# Patient Record
Sex: MALE | Race: WHITE | Employment: FULL TIME | URBAN - METROPOLITAN AREA
[De-identification: names, ages, dates, MRNs, and addresses within clinical notes are randomized per-mention and may not be internally consistent; named-entity substitution may affect disease eponyms.]

---

## 2021-01-31 ENCOUNTER — HOSPITAL ENCOUNTER (EMERGENCY)
Age: 41
Discharge: HOME | End: 2021-01-31
Attending: EMERGENCY MEDICINE
Payer: COMMERCIAL

## 2021-01-31 VITALS
OXYGEN SATURATION: 99 % | DIASTOLIC BLOOD PRESSURE: 85 MMHG | WEIGHT: 135 LBS | HEIGHT: 67 IN | RESPIRATION RATE: 18 BRPM | SYSTOLIC BLOOD PRESSURE: 118 MMHG | BODY MASS INDEX: 21.19 KG/M2 | HEART RATE: 65 BPM | TEMPERATURE: 98.4 F

## 2021-01-31 DIAGNOSIS — L03.031 CELLULITIS OF FOURTH TOE, RIGHT: Primary | ICD-10-CM

## 2021-01-31 PROCEDURE — 75810000289 HC I&D ABSCESS SIMP/COMP/MULT

## 2021-01-31 PROCEDURE — 74011250637 HC RX REV CODE- 250/637: Performed by: EMERGENCY MEDICINE

## 2021-01-31 PROCEDURE — 74011000250 HC RX REV CODE- 250: Performed by: EMERGENCY MEDICINE

## 2021-01-31 PROCEDURE — 99283 EMERGENCY DEPT VISIT LOW MDM: CPT

## 2021-01-31 RX ORDER — CIPROFLOXACIN 500 MG/1
750 TABLET ORAL
Status: COMPLETED | OUTPATIENT
Start: 2021-01-31 | End: 2021-01-31

## 2021-01-31 RX ORDER — LIDOCAINE HYDROCHLORIDE 10 MG/ML
5 INJECTION, SOLUTION EPIDURAL; INFILTRATION; INTRACAUDAL; PERINEURAL ONCE
Status: COMPLETED | OUTPATIENT
Start: 2021-01-31 | End: 2021-01-31

## 2021-01-31 RX ORDER — CIPROFLOXACIN 750 MG/1
750 TABLET, FILM COATED ORAL 2 TIMES DAILY
Qty: 28 TAB | Refills: 0 | Status: SHIPPED | OUTPATIENT
Start: 2021-01-31 | End: 2021-02-14

## 2021-01-31 RX ORDER — ONDANSETRON 4 MG/1
4 TABLET, ORALLY DISINTEGRATING ORAL
Status: COMPLETED | OUTPATIENT
Start: 2021-01-31 | End: 2021-01-31

## 2021-01-31 RX ORDER — CIPROFLOXACIN 750 MG/1
750 TABLET, FILM COATED ORAL 2 TIMES DAILY
Qty: 28 TAB | Refills: 0 | OUTPATIENT
Start: 2021-01-31 | End: 2021-01-31 | Stop reason: SDUPTHER

## 2021-01-31 RX ADMIN — CIPROFLOXACIN HYDROCHLORIDE 750 MG: 500 TABLET, FILM COATED ORAL at 23:26

## 2021-01-31 RX ADMIN — ONDANSETRON 4 MG: 4 TABLET, ORALLY DISINTEGRATING ORAL at 22:57

## 2021-01-31 RX ADMIN — LIDOCAINE HYDROCHLORIDE 5 ML: 10 INJECTION, SOLUTION EPIDURAL; INFILTRATION; INTRACAUDAL; PERINEURAL at 22:38

## 2021-02-01 NOTE — ED NOTES
Pt in ED w/ complaint of 4th R toe, 2nd L toe, and 1st L finger tenderness and swelling. Pt also has redness to his toes and finger. Pt denies any fever, itching, injury, or drainage from these areas. Pt denies these symptoms happening before. Pt is A&O X 4 and appears to be in no distress. Emergency Department Nursing Plan of Care       The Nursing Plan of Care is developed from the Nursing assessment and Emergency Department Attending provider initial evaluation. The plan of care may be reviewed in the ED Provider note.     The Plan of Care was developed with the following considerations:   Patient / Family readiness to learn indicated by:verbalized understanding  Persons(s) to be included in education: patient  Barriers to Learning/Limitations:No    Signed     Cammy Quevedo RN    1/31/2021   11:18 PM

## 2021-02-01 NOTE — ED PROVIDER NOTES
EMERGENCY DEPARTMENT HISTORY AND PHYSICAL EXAM      Date: 1/31/2021  Patient Name: Froilan Garcia    History of Presenting Illness     Chief Complaint   Patient presents with    Toe Pain       History Provided By: Patient    HPI: Froilan Garcia, 47 y.o. male presents to the ED with cc of right fourth toe redness, pain, swelling. Symptoms have been progressively worsening over the past 3 to 5 days. He has been on doxycycline without significant relief of symptoms. He has increased swelling and pain just proximal to the nail and concern for paronychia. He swims frequently in an indoor pool and also bikes frequently. Denies any fevers, chills. Still able to ambulate. No injury or trauma. There are no other complaints, changes, or physical findings at this time. PCP: None    No current facility-administered medications on file prior to encounter. No current outpatient medications on file prior to encounter. Past History     Past Medical History:  History reviewed. No pertinent past medical history. Past Surgical History:  History reviewed. No pertinent surgical history. Family History:  History reviewed. No pertinent family history. Social History:  Social History     Tobacco Use    Smoking status: Never Smoker    Smokeless tobacco: Never Used   Substance Use Topics    Alcohol use: Never     Frequency: Never    Drug use: Never       Allergies:  No Known Allergies      Review of Systems   Review of Systems   Constitutional: Negative for chills and fever. Musculoskeletal: Positive for joint swelling. Negative for gait problem. Skin: Positive for color change. Neurological: Negative for weakness and numbness. All other systems reviewed and are negative. Physical Exam   Physical Exam  Vitals signs and nursing note reviewed. Constitutional:       General: He is not in acute distress. Appearance: Normal appearance. He is not ill-appearing or toxic-appearing.    HENT:      Head: Normocephalic and atraumatic. Cardiovascular:      Rate and Rhythm: Normal rate and regular rhythm. Pulmonary:      Effort: Pulmonary effort is normal. No respiratory distress. Musculoskeletal:      Comments: Right fourth toe pain, erythema, swelling consistent with cellulitis. There is some firmness and increased swelling just proximal to the nailbed consistent with possible paronychia. Neurovascularly intact. No sign of cellulitis throughout foot. Skin:     General: Skin is warm and dry. Findings: No erythema or rash. Neurological:      General: No focal deficit present. Mental Status: He is alert and oriented to person, place, and time. Diagnostic Study Results     Labs -   No results found for this or any previous visit (from the past 12 hour(s)). Radiologic Studies -   No orders to display     CT Results  (Last 48 hours)    None        CXR Results  (Last 48 hours)    None          Medical Decision Making   I am the first provider for this patient. I reviewed the vital signs, available nursing notes, past medical history, past surgical history, family history and social history. Vital Signs-Reviewed the patient's vital signs. Patient Vitals for the past 12 hrs:   Temp Pulse Resp BP SpO2   01/31/21 2225 98.4 °F (36.9 °C) 65 18 118/85 99 %       Records Reviewed: Nursing Notes    Provider Notes (Medical Decision Making):   29-year-old healthy male here with right fourth toe cellulitis and possible paronychia. Exam as above. Appears clinically well and nontoxic. Attempted paronychia without drainage of any purulence. Doxycycline does not appear to be helping and will expand with Cipro as he has been swimming. Procedure Note - Digital Block:   11:43 PM  Performed by: Francisco Louis MD  Lidocaine 1% without epinephrine used to perform digital block of Right fourth toe(s). The procedure took 1-15 minutes, and pt tolerated well.         Procedure Note - Incision and Drainage:   11:43 PM  Performed by: Yin Toth MD  Complexity: simple   Sterile field established. Anesthesia achieved with 2 mLs of Lidocaine 1% without epinephrine using a digital block using 2 mL lidocaine 1% without epinephrine. Paronychia to R 4th toe was incised with # 11 blade, and scant bloody drainage was expressed. Estimated blood loss: < 5 cc  The procedure took 1-15 minutes, and pt tolerated well. ED Course:   Initial assessment performed. The patients presenting problems have been discussed, and they are in agreement with the care plan formulated and outlined with them. I have encouraged them to ask questions as they arise throughout their visit. Discharge Note:  The patient has been re-evaluated and is ready for discharge. Reviewed available results with patient. Counseled patient on diagnosis and care plan. Patient has expressed understanding, and all questions have been answered. Patient agrees with plan and agrees to follow up as recommended, or to return to the ED if their symptoms worsen. Discharge instructions have been provided and explained to the patient, along with reasons to return to the ED. Disposition:  D/c    DISCHARGE PLAN:  1. Current Discharge Medication List      START taking these medications    Details   ciprofloxacin HCl (CIPRO) 750 mg tablet Take 1 Tab by mouth two (2) times a day for 14 days. Qty: 28 Tab, Refills: 0           2. Follow-up Information     Follow up With Specialties Details Why Contact Info    Anna Andre DPM Podiatry   [facility]  [address]  [employer] [address]. [phone]          3. Return to ED if worse     Diagnosis     Clinical Impression:   1. Cellulitis of fourth toe, right        Attestations:    Yin Toth MD    Please note that this dictation was completed with [employer], the [employer] voice recognition software.   Quite often unanticipated grammatical, syntax, homophones, and other interpretive errors are inadvertently transcribed by the computer software. Please disregard these errors. Please excuse any errors that have escaped final proofreading. Thank you.

## 2021-02-11 ENCOUNTER — TRANSCRIBE ORDER (OUTPATIENT)
Dept: SCHEDULING | Age: 41
End: 2021-02-11

## 2021-02-11 DIAGNOSIS — I74.9 EMBOLISM AND THROMBOSIS OF UNSPECIFIED ARTERY (HCC): Primary | ICD-10-CM

## 2021-02-11 DIAGNOSIS — I70.91 GENERALIZED ATHEROSCLEROSIS: ICD-10-CM

## 2021-02-11 DIAGNOSIS — R60.1 ANASARCA: ICD-10-CM
